# Patient Record
Sex: FEMALE | Race: WHITE
[De-identification: names, ages, dates, MRNs, and addresses within clinical notes are randomized per-mention and may not be internally consistent; named-entity substitution may affect disease eponyms.]

---

## 2018-05-01 ENCOUNTER — HOSPITAL ENCOUNTER (OUTPATIENT)
Dept: HOSPITAL 87 - OR | Age: 68
Discharge: HOME | End: 2018-05-01
Attending: OPHTHALMOLOGY
Payer: COMMERCIAL

## 2018-05-01 VITALS — HEIGHT: 67 IN | WEIGHT: 230 LBS | BODY MASS INDEX: 36.1 KG/M2

## 2018-05-01 DIAGNOSIS — I10: ICD-10-CM

## 2018-05-01 DIAGNOSIS — E03.8: ICD-10-CM

## 2018-05-01 DIAGNOSIS — M16.12: ICD-10-CM

## 2018-05-01 DIAGNOSIS — Z79.899: ICD-10-CM

## 2018-05-01 DIAGNOSIS — Z88.0: ICD-10-CM

## 2018-05-01 DIAGNOSIS — Z88.2: ICD-10-CM

## 2018-05-01 DIAGNOSIS — Z91.09: ICD-10-CM

## 2018-05-01 DIAGNOSIS — H25.89: Primary | ICD-10-CM

## 2018-05-01 PROCEDURE — 66984 XCAPSL CTRC RMVL W/O ECP: CPT

## 2018-08-07 ENCOUNTER — HOSPITAL ENCOUNTER (OUTPATIENT)
Dept: HOSPITAL 87 - OR | Age: 68
Discharge: HOME | End: 2018-08-07
Attending: OPHTHALMOLOGY
Payer: COMMERCIAL

## 2018-08-07 VITALS — WEIGHT: 230 LBS | BODY MASS INDEX: 36.1 KG/M2 | HEIGHT: 67 IN

## 2018-08-07 DIAGNOSIS — I10: ICD-10-CM

## 2018-08-07 DIAGNOSIS — Z79.899: ICD-10-CM

## 2018-08-07 DIAGNOSIS — Z88.0: ICD-10-CM

## 2018-08-07 DIAGNOSIS — H25.012: Primary | ICD-10-CM

## 2018-08-07 DIAGNOSIS — Z79.82: ICD-10-CM

## 2018-08-07 DIAGNOSIS — E03.9: ICD-10-CM

## 2018-08-07 DIAGNOSIS — M16.12: ICD-10-CM

## 2018-08-07 DIAGNOSIS — Z88.2: ICD-10-CM

## 2018-08-07 DIAGNOSIS — Z88.8: ICD-10-CM

## 2018-08-07 PROCEDURE — 66984 XCAPSL CTRC RMVL W/O ECP: CPT

## 2019-08-16 NOTE — PRE-OP HX & PHY REPO 2 SIG
DATE OF ADMISSION:  08/19/2019

DATE OF SURGERY:  08/19/2019.



PREOPERATIVE DIAGNOSIS:  Total retinal detachment, left eye.



BRIEF NOTE:  This is a first [facility] admission for Ms. Mejia.

This is a very nice 68-year-old lady, who complained of blurred vision in

the left eye after cataract surgery.  On examination, she was found to

have a total retinal detachment on this side.



PAST OCULAR HISTORY:  Remarkable for a similar cataract surgery done on the

right eye in May of 2018 with out problems.



MEDICAL HISTORY:  Remarkable for hypertension.



MEDICATIONS:  She is on metoprolol and hydrochlorothiazide for this

condition as well as pravastatin for elevated cholesterol and

levothyroxine.



ALLERGIES:  She is allergic to sulfa and penicillins.



OCULAR EXAMINATION:  Best vision at the time of admission was 20/25 in the

right eye and counting fingers at 2 feet in the left with pressures of 18

and 3.  The anterior segment on the right showed a posterior chamber lens

in good position.  The left eye showed a similar position in posterior

chamber lens.  Funduscopic exam of the right eye showed a choroidal nevus

inferior and slightly nasal to the nerve, but appeared flat and benign.

The periphery was without retinal tears.  The left fundus showed a macula

off total retinal detachment.  There was suspicion of the retinal break at

the 10 o'clock position mixed with a fold.



General physical examination was done by the patient's internist, Dr. Bose.



ASSESSMENT:  Macula off retinal detachment, total, left eye.



PLAN:  The plan is to perform a pars plana vitrectomy with scleral

buckling, endo drainage, endolaser, and probable silicone oil injection in

the left eye.  The risks and benefits of surgery were gone over with the

patient with potential for infection, the need for additional operations,

recurrent detachment, and remote possibility of loss of the eye.  The risk

of anesthesia was discussed.  The patient understands and consents to the

surgery, which will be performed on Monday morning.









  ______________________________________________

  Curtis Colby M.D.





DR:  MARITO

D:  08/16/2019 13:55

T:  08/16/2019 17:09

JOB#:  [id number]

CC:

## 2019-08-19 ENCOUNTER — HOSPITAL ENCOUNTER (OUTPATIENT)
Dept: HOSPITAL 72 - SUR | Age: 69
Discharge: HOME | End: 2019-08-19
Payer: COMMERCIAL

## 2019-08-19 VITALS — BODY MASS INDEX: 37.51 KG/M2 | WEIGHT: 239 LBS | HEIGHT: 67 IN

## 2019-08-19 VITALS — SYSTOLIC BLOOD PRESSURE: 140 MMHG | DIASTOLIC BLOOD PRESSURE: 65 MMHG

## 2019-08-19 VITALS — DIASTOLIC BLOOD PRESSURE: 95 MMHG | SYSTOLIC BLOOD PRESSURE: 146 MMHG

## 2019-08-19 VITALS — DIASTOLIC BLOOD PRESSURE: 85 MMHG | SYSTOLIC BLOOD PRESSURE: 138 MMHG

## 2019-08-19 VITALS — SYSTOLIC BLOOD PRESSURE: 145 MMHG | DIASTOLIC BLOOD PRESSURE: 66 MMHG

## 2019-08-19 VITALS — SYSTOLIC BLOOD PRESSURE: 147 MMHG | DIASTOLIC BLOOD PRESSURE: 72 MMHG

## 2019-08-19 VITALS — DIASTOLIC BLOOD PRESSURE: 55 MMHG | SYSTOLIC BLOOD PRESSURE: 144 MMHG

## 2019-08-19 VITALS — DIASTOLIC BLOOD PRESSURE: 88 MMHG | SYSTOLIC BLOOD PRESSURE: 135 MMHG

## 2019-08-19 VITALS — DIASTOLIC BLOOD PRESSURE: 70 MMHG | SYSTOLIC BLOOD PRESSURE: 148 MMHG

## 2019-08-19 VITALS — DIASTOLIC BLOOD PRESSURE: 77 MMHG | SYSTOLIC BLOOD PRESSURE: 144 MMHG

## 2019-08-19 DIAGNOSIS — Z88.2: ICD-10-CM

## 2019-08-19 DIAGNOSIS — E03.9: ICD-10-CM

## 2019-08-19 DIAGNOSIS — H33.052: Primary | ICD-10-CM

## 2019-08-19 DIAGNOSIS — K21.9: ICD-10-CM

## 2019-08-19 DIAGNOSIS — E66.01: ICD-10-CM

## 2019-08-19 DIAGNOSIS — I10: ICD-10-CM

## 2019-08-19 DIAGNOSIS — Z90.49: ICD-10-CM

## 2019-08-19 DIAGNOSIS — Z88.0: ICD-10-CM

## 2019-08-19 PROCEDURE — 94150 VITAL CAPACITY TEST: CPT

## 2019-08-19 PROCEDURE — 94003 VENT MGMT INPAT SUBQ DAY: CPT

## 2019-08-19 PROCEDURE — 67108 REPAIR DETACHED RETINA: CPT

## 2019-08-19 RX ADMIN — CYCLOPENTOLATE HYDROCHLORIDE SCH DROP: 10 SOLUTION OPHTHALMIC at 06:29

## 2019-08-19 RX ADMIN — MOXIFLOXACIN HYDROCHLORIDE SCH DROP: 5 SOLUTION/ DROPS OPHTHALMIC at 06:29

## 2019-08-19 RX ADMIN — FLURBIPROFEN SODIUM SCH DROP: 0.3 SOLUTION/ DROPS OPHTHALMIC at 06:29

## 2019-08-19 RX ADMIN — MOXIFLOXACIN HYDROCHLORIDE SCH DROP: 5 SOLUTION/ DROPS OPHTHALMIC at 06:18

## 2019-08-19 RX ADMIN — PHENYLEPHRINE HYDROCHLORIDE SCH DROP: 25 SOLUTION/ DROPS OPHTHALMIC at 06:18

## 2019-08-19 RX ADMIN — PHENYLEPHRINE HYDROCHLORIDE SCH DROP: 25 SOLUTION/ DROPS OPHTHALMIC at 06:29

## 2019-08-19 RX ADMIN — MOXIFLOXACIN HYDROCHLORIDE SCH DROP: 5 SOLUTION/ DROPS OPHTHALMIC at 06:23

## 2019-08-19 RX ADMIN — PHENYLEPHRINE HYDROCHLORIDE SCH DROP: 25 SOLUTION/ DROPS OPHTHALMIC at 06:24

## 2019-08-19 RX ADMIN — CYCLOPENTOLATE HYDROCHLORIDE SCH DROP: 10 SOLUTION OPHTHALMIC at 06:18

## 2019-08-19 RX ADMIN — CYCLOPENTOLATE HYDROCHLORIDE SCH DROP: 10 SOLUTION OPHTHALMIC at 06:24

## 2019-08-19 RX ADMIN — FLURBIPROFEN SODIUM SCH DROP: 0.3 SOLUTION/ DROPS OPHTHALMIC at 06:18

## 2019-08-19 RX ADMIN — FLURBIPROFEN SODIUM SCH DROP: 0.3 SOLUTION/ DROPS OPHTHALMIC at 06:23

## 2019-08-19 NOTE — BRIEF OPERATIVE NOTE
Immediate Post Operative Note


Operative Note


Chief Complaint:  Loss of vision Left Eye


Pre-op Diagnosis:


Total retinal detachment Left eye


Procedure:


PPV, Scleral buckle (240 band, 287 tire, 70 sleeve) membrane peel, endolaser (

1331 spots), Kenalog injection and removal, Endodrainage, Silicone oil 

injection Left eye


Post-op Diagnosis:  same as pre-op


Surgeon:  Earnestine


Anesthesiologist:  Cory


Specimen:  none


Complications:  none


Condition:  stable


Fluids:  Per anesthesia


Estimated Blood Loss:  none


Drains:  none


Implant(s) used?:  Yes











Curtis Colby MD Aug 19, 2019 10:24

## 2019-08-19 NOTE — ANETHESIA PREOPERATIVE EVAL
Anesthesia Pre-op PMH/ROS


General


Date of Evaluation:  Aug 19, 2019


Anesthesiologist:  [place]


ASA Score:  ASA 2


Mallampati Score


Class I : Soft palate, uvula, fauces, pillars visible


Class II: Soft palate, uvula, fauces visible


Class III: Soft palate, base of uvula visible


Class IV: Only hard plate visible


Mallampati Classification:  Class III


Surgeon:  Karon


Diagnosis:  Left retinal detachment


Surgical Procedure:  Left eye vitrectomy


Anesthesia History:  none


Family History:  no anesthesia problems


Allergies:  


Coded Allergies:  


     PENICILLINS (Verified  Allergy, Unknown, 8/16/19)


     SULFA (SULFONAMIDE ANTIBIOTICS) (Verified  Allergy, Unknown, 8/16/19)


Medications:  see eMAR


Patient NPO?:  Yes


NPO Date:  Aug 18, 2019


NPO Time:  22:00





Past Medical History


Cardiovascular:  Reports: HTN, other - HLD; 


   Denies: CAD, MI, valve dz, arrhythmia


Pulmonary:  Denies: asthma, COPD, CHITRA, other


Gastrointestinal/Genitourinary:  Reports: GERD; 


   Denies: CRI, ESRD, other


Neurologic/Psychiatric:  Denies: dementia, CVA, depression/anxiety, TIA, other


Endocrine:  Reports: hypothyroidism; 


   Denies: DM, steroids, other


HEENT:  Reports: [place] (L), [place] (R); 


   Denies: cataract (L), cataract (R), glaucoma, other


Hematology/Immune:  Denies: anemia, DVT, bleeding disorder, other


Musculoskeletal/Integumentary:  Denies: OA, RA, DJD, DDD, edema, other


Other:  obesity - morbid


PSxH Narrative:


lap cristin, b cataracts, T&A





Anesthesia Pre-op Phys. Exam


Physician Exam





Last Vital Signs








  Date Time  Temp Pulse Resp B/P (MAP) Pulse Ox O2 Delivery O2 Flow Rate FiO2


 


8/19/19 06:27      Room Air  


 


8/19/19 06:15 97.1 68 18 144/55 98   








Constitutional:  NAD


Cardiovascular:  RRR


Respiratory:  CTA





Airway Exam


Mallampati Score:  Class III


MO:  limited


Neck:  short, obese


ROM:  limited





Anesthesia Pre-op A/P


Labs


see chart





Studies


Pre-op Studies:  EKG - sr





Risk Assessment & Plan


Assessment:


ASA II


Plan:


GA- as per surgical request


Status Change Before Surgery:  No





Pre-Antibiotics


Drug:  N/A











Irina Holland MD Aug 19, 2019 07:16

## 2019-08-19 NOTE — PRE-PROCEDURE NOTE/ATTESTATION
Pre-Procedure Note/Attestation


Complete Prior to Procedure


Planned Procedure:  left


Procedure Narrative:


PPV, membrane peel, scleral buckle, endodrainage, possible silicone oil 

injection, possible gas injection Left eye.





Indications for Procedure


Pre-Operative Diagnosis:


Total retinal detachment Left eye





Attestation


I attest that I discussed the nature of the procedure; its benefits; risks and 

complications; and alternatives (and the risks and benefits of such alternatives

), prior to the procedure, with the patient (or the patient's legal 

representative).





I attest that, if there was a reasonable possibility of needing a blood 

transfusion, the patient (or the patient's legal representative) was given the 

[place] [facility] standardized written summary, pursuant 

to the Derrick Yan Blood Safety Act ([place] Health and Safety Code # 1645, as 

amended).





I attest that I re-evaluated the patient just prior to the surgery and that 

there has been no change in the patient's H&P, except as documented below:











Curtis Colby MD Aug 19, 2019 07:53

## 2019-08-19 NOTE — 48 HOUR POST ANESTHESIA EVAL
Post Anesthesia Evaluation


Procedure:  Left eye posterior vitrectomy


Date of Evaluation:  Aug 19, 2019


Airway:  patent


Nausea:  No


Vomiting:  No


Hydration Status:  adequate


Cardiopulmonary Status:


at baseline


Mental Status/LOC:  patient returned to baseline


Post-Anesthesia Complications:


0


Follow-up care needed:  ready to discharge











Irina Holland MD Aug 19, 2019 10:14

## 2019-08-19 NOTE — IMMEDIATE POST-OP EVALUATION
Immediate Post-Op Evalulation


Immediate Post-Op Evalulation


Procedure:  Left eye posterior vitrectomy


Date of Evaluation:  Aug 19, 2019


Time of Evaluation:  10:14


IV Fluids:  300


Blood Products:  0


Estimated Blood Loss:  0


Urinary Output:  0


Blood Pressure Systolic:  146


Blood Pressure Diastolic:  95


Pulse Rate:  71


Respiratory Rate:  16


O2 Sat by Pulse Oximetry:  99


Temperature (Fahrenheit):  98.4


Pain Score (1-10):  0


Nausea:  No


Vomiting:  No


Complications


0


Patient Status:  awake, reacts, patent, none


Hydration Status:  adequate


Drug:  N/A











Irina Holland MD Aug 19, 2019 10:12

## 2019-08-19 NOTE — OPERATIVE NOTE - DICTATED
DATE OF OPERATION:  08/19/2019

PREOPERATIVE DIAGNOSIS:  Total retinal detachment, left eye.



POSTOPERATIVE DIAGNOSIS:  Total retinal detachment, left eye.



PROCEDURES:

1. Pars plana vitrectomy.

2. Scleral buckle.

3. Endo drainage.

4. Endolaser.

5. Kenalog injection.

6. Silicone oil injection, left eye.



SURGEON:  Curtis Colby M.D.



ASSISTANT:  None.



ANESTHESIA:  LMA general.



ANESTHESIOLOGIST:  Dr. Ray.



JUSTIFICATION FOR SURGERY:  This 68-year-old lady, status post cataract

surgery, was noted to have a total retinal detachment in the left eye.



BRIEF NOTE:  The patient was brought to the operative room and placed on OR

table in supine position.  After a time-out was performed and agreed upon

by the staff, general LMA anesthesia was induced by Dr. Ray.  The

patient was then prepped and draped in the normal manner.  A lid speculum

was inserted into the left eye.  A 360-degree peritomy was cut with

relaxation incisions at 3 and 9 o'clock positions.  Additional anesthetic

was then injected in the subtenon's space.  The muscles were isolated in

turn, on white and black ties.  The quadrants were explored and found to

be without scleral thinning.  Initial evaluation showed a total retinal

detachment with suspicious areas near 12 o'clock and along the inferior

retina at 4 and 6 o'clock.  Because of this, it was elected to place a

scleral buckle.  A segment of 287 tire was chosen as well as a 240 band.

The sutures were placed centered 14 mm from the limbus.  And the inferior

quadrants sutures were placed 6 mm apart and above 3 mm apart.  A 240 band

was then placed around the eye and secured with a 70 sleeve in the

superonasal quadrant.  A segment of 287 tire was then placed below the

inferior rectus and the two inferior mattress sutures.  The knots were

tied and rotated posteriorly.  The buckle was left moderately shallow

within somewhat long for later adjustment.



Preparation was then made for vitrectomy.  Using a 23-gauge trocar

system, cannulas were placed in all except infranasal quadrant.  Infusion

secured inferotemporally.  Vitrectomy was begun posterior to the lens

capsular complex taking care to avoid contact.  A central core vitrectomy

was done followed by peripheral vitrectomy leaving a small vitreous skirt.

The retina was noted to be totally detached and care was taken not to

engage retina.  Examination superiorly showed a large horseshoe tear

associated with old pigment.  No additional definite breaks were seen,

although pigmentation was noted at 6 and 4 o'clock.



Because of the bullous nature of the detachment, it was elected to do

endo drainage.  The posterior hyaloid had been removed with the aid of

silicone and so an area just nasal and slightly superior to the optic

nerve was chosen.  Endo cautery was placed followed by smooth drainage of

subretinal fluid through the retinotomy.  The retina was noted to flatten

nicely without significant folds or puckering.



With the retina under air, the endolaser was brought into the eye and a

power of 0.4 snyder duration 0.1 seconds, a total of 1331 lesions were

applied to surround the very large horseshoe tear and pigment superiorly

and also the two areas inferiorly.  With the retina flattened, endolaser

was also used to surround the posterior retinotomy.



At this juncture, silicone oil injection was performed using 1000

centistokes of silicone oil.  Roughly a 95% fill was obtained with the eye

being left normal to soft.  The sclerotomies were then closed with 8-0

Vicryl suture and conjunctiva and tenons capsule were pulled up and

secured with 6-0 plain catgut.  Subconjunctival Decadron and gentamicin

were then injected and topical prednisolone drops, Vigamox drops, and

atropine drops were instilled.  The eye was patched and shielded and the

patient was taken to Recovery in excellent condition.  There were no

complications.









  ______________________________________________

  Curtis Colby M.D.





DR:  RONAL

D:  08/19/2019 10:32

T:  08/19/2019 20:32

JOB#:  [id number]

CC:

## 2019-10-31 NOTE — PRE-OP HX & PHY REPO 2 SIG
DATE OF ADMISSION:  11/04/2019

PREOPERATIVE DIAGNOSIS:  Status post retinal detachment repair with

silicone oil, left eye.



BRIEF NOTE:  This is the second [facility] admission for the patient

who is very nice 69-year-old lady, who presented with a retinal detachment

in the left eye, which was repaired at [place] on 08/19/2019.  Silicone

oil was used in the repair as well as a scleral buckle.  She has done well

since procedure with complete flattening of the retina and is now admitted

for removal of the silicone oil.



PAST MEDICAL HISTORY:  Remarkable for hypertension.



MEDICATIONS:  She is on levothyroxine, metoprolol, hydrochlorothiazide, and

pravastatin.



ALLERGIES:  She has allergies to sulfa and penicillins.



SOCIAL HISTORY:  She does not smoke.



PHYSICAL EXAMINATION:  Best vision at the time of admission was 20/30 in

the right eye and 20/400 in the left with pressures of 18 and 12.  The

anterior segment on the right showed a posterior chamber lens in excellent

position.  The left showed a similarly positioned posterior chamber lens.

Fundus on the right showed a choroidal nevus inferior and slightly nasal

to the disc.  The retina was all attached.  The left fundus showed roughly

an 85% silicone oil fill.  There was retinal ablation to a sizable break

at the 12 o'clock position.  The posterior retinotomy was noted nasal to

the optic nerve.  The retina was entirely attached.



General physical examination was completed by the patient's internist.



ASSESSMENT:  Status post retinal detachment repair, left eye with silicone

oil.



PLAN:  Plan is to perform a pars plana vitrectomy with removal of the

silicone oil and placement of additional laser ablation if needed.  The

risks and benefits of surgery have been gone over with the patient

including the potential for infection, hemorrhage, glaucoma, recurrent

detachment, and remote possibility of loss of the eye.  The risk of

anesthesia was discussed.  The patient understands and consents to the

surgery, which will be performed on Monday morning.









  ______________________________________________

  Curtis Colby M.D.





DR:  TONYA

D:  10/31/2019 13:02

T:  10/31/2019 15:39

JOB#:  [id number]

CC:

## 2019-11-04 ENCOUNTER — HOSPITAL ENCOUNTER (OUTPATIENT)
Dept: HOSPITAL 72 - SUR | Age: 69
Discharge: HOME | End: 2019-11-04
Payer: COMMERCIAL

## 2019-11-04 VITALS — DIASTOLIC BLOOD PRESSURE: 62 MMHG | SYSTOLIC BLOOD PRESSURE: 138 MMHG

## 2019-11-04 VITALS — DIASTOLIC BLOOD PRESSURE: 93 MMHG | SYSTOLIC BLOOD PRESSURE: 143 MMHG

## 2019-11-04 VITALS — SYSTOLIC BLOOD PRESSURE: 132 MMHG | DIASTOLIC BLOOD PRESSURE: 64 MMHG

## 2019-11-04 VITALS — HEIGHT: 67 IN | BODY MASS INDEX: 37.67 KG/M2 | WEIGHT: 240 LBS

## 2019-11-04 VITALS — SYSTOLIC BLOOD PRESSURE: 144 MMHG | DIASTOLIC BLOOD PRESSURE: 61 MMHG

## 2019-11-04 VITALS — SYSTOLIC BLOOD PRESSURE: 113 MMHG | DIASTOLIC BLOOD PRESSURE: 62 MMHG

## 2019-11-04 VITALS — SYSTOLIC BLOOD PRESSURE: 127 MMHG | DIASTOLIC BLOOD PRESSURE: 65 MMHG

## 2019-11-04 VITALS — DIASTOLIC BLOOD PRESSURE: 65 MMHG | SYSTOLIC BLOOD PRESSURE: 143 MMHG

## 2019-11-04 VITALS — DIASTOLIC BLOOD PRESSURE: 77 MMHG | SYSTOLIC BLOOD PRESSURE: 142 MMHG

## 2019-11-04 DIAGNOSIS — K21.9: ICD-10-CM

## 2019-11-04 DIAGNOSIS — Z79.899: ICD-10-CM

## 2019-11-04 DIAGNOSIS — G47.33: ICD-10-CM

## 2019-11-04 DIAGNOSIS — Z88.2: ICD-10-CM

## 2019-11-04 DIAGNOSIS — H33.22: Primary | ICD-10-CM

## 2019-11-04 DIAGNOSIS — Z88.0: ICD-10-CM

## 2019-11-04 DIAGNOSIS — E03.9: ICD-10-CM

## 2019-11-04 DIAGNOSIS — M19.90: ICD-10-CM

## 2019-11-04 DIAGNOSIS — I10: ICD-10-CM

## 2019-11-04 DIAGNOSIS — E66.9: ICD-10-CM

## 2019-11-04 PROCEDURE — 94150 VITAL CAPACITY TEST: CPT

## 2019-11-04 PROCEDURE — 67039 LASER TREATMENT OF RETINA: CPT

## 2019-11-04 PROCEDURE — 94003 VENT MGMT INPAT SUBQ DAY: CPT

## 2019-11-04 RX ADMIN — PHENYLEPHRINE HYDROCHLORIDE SCH DROP: 25 SOLUTION/ DROPS OPHTHALMIC at 05:58

## 2019-11-04 RX ADMIN — MOXIFLOXACIN HYDROCHLORIDE SCH DROP: 5 SOLUTION/ DROPS OPHTHALMIC at 05:57

## 2019-11-04 RX ADMIN — CYCLOPENTOLATE HYDROCHLORIDE SCH DROP: 10 SOLUTION OPHTHALMIC at 06:23

## 2019-11-04 RX ADMIN — PHENYLEPHRINE HYDROCHLORIDE SCH DROP: 25 SOLUTION/ DROPS OPHTHALMIC at 06:23

## 2019-11-04 RX ADMIN — MOXIFLOXACIN HYDROCHLORIDE SCH DROP: 5 SOLUTION/ DROPS OPHTHALMIC at 06:09

## 2019-11-04 RX ADMIN — CYCLOPENTOLATE HYDROCHLORIDE SCH DROP: 10 SOLUTION OPHTHALMIC at 06:08

## 2019-11-04 RX ADMIN — PHENYLEPHRINE HYDROCHLORIDE SCH DROP: 25 SOLUTION/ DROPS OPHTHALMIC at 06:08

## 2019-11-04 RX ADMIN — MOXIFLOXACIN HYDROCHLORIDE SCH DROP: 5 SOLUTION/ DROPS OPHTHALMIC at 06:24

## 2019-11-04 RX ADMIN — FLURBIPROFEN SODIUM SCH DROP: 0.3 SOLUTION/ DROPS OPHTHALMIC at 06:09

## 2019-11-04 RX ADMIN — FLURBIPROFEN SODIUM SCH DROP: 0.3 SOLUTION/ DROPS OPHTHALMIC at 06:23

## 2019-11-04 RX ADMIN — CYCLOPENTOLATE HYDROCHLORIDE SCH DROP: 10 SOLUTION OPHTHALMIC at 05:57

## 2019-11-04 RX ADMIN — FLURBIPROFEN SODIUM SCH DROP: 0.3 SOLUTION/ DROPS OPHTHALMIC at 05:58

## 2019-11-04 NOTE — IMMEDIATE POST-OP EVALUATION
Immediate Post-Op Evalulation


Immediate Post-Op Evalulation


Procedure:  L eye PPV, silicon oil removal laser treatment


Date of Evaluation:  Nov 4, 2019


Time of Evaluation:  08:53


IV Fluids:  300


Blood Products:  none


Estimated Blood Loss:  min


Urinary Output:  none


Blood Pressure Systolic:  143


Blood Pressure Diastolic:  82


Pulse Rate:  62


Respiratory Rate:  18


O2 Sat by Pulse Oximetry:  99


Temperature (Fahrenheit):  97.5


Pain Score (1-10):  1


Nausea:  No


Vomiting:  No


Complications


none


Patient Status:  awake, patent, none


Hydration Status:  adequate











Jossue Thomas MD Nov 4, 2019 08:54

## 2019-11-04 NOTE — OPERATIVE NOTE - DICTATED
DATE OF OPERATION:  11/04/2019

PREOPERATIVE DIAGNOSIS:  Status post retinal detachment repair with

silicone oil, left eye.



POSTOPERATIVE DIAGNOSIS:  Status post retinal detachment repair with

silicone oil, left eye.



PROCEDURES:

1. Pars plana vitrectomy.

2. Endolaser.

3. Removal of silicone oil.

4. Air-fluid exchange, left eye.



SURGEON:  Curtis Colby M.D.



ASSISTANT:  None.



ANESTHESIA:  Local with sedation.



ANESTHESIOLOGIST:  Jossue Thomas M.D.



JUSTIFICATION FOR SURGERY:  This 69-year-old lady developed a retinal

detachment several months ago, which required a scleral buckle,

vitrectomy, and silicone oil injection.  She was readmitted for removal of

silicon oil.



BRIEF NOTE:  The patient was brought to the operating room, placed on

operating room table in supine position.  After a time-out was performed

and agreed upon by the staff, and initial monitoring secured by Dr. Thomas, retrobulbar and Van Lint blocks were given in the standard way.

When the blocks taken effect, she was prepped and draped in normal

manner.  A lid speculum was inserted into the left eye.  Using a 23-gauge

trocar system, cannulas were placed in all except infranasal quadrant.

Infusion secured inferotemporally.  Vitrectomy was begun posterior to the

lens by removing the silicone oil bubble with the viscous fluid extractor.

This went without difficulty.  Additional small bubbles were removed with

the vitrector.  It was elected to reinforce the retinopexy posterior to

the prior line of laser.  The endolaser was then brought into the eye and

a power of 0.35 snyder and duration of 0.1 seconds, a total of 836 lesions

were applied inferiorly from _____ placing three rows posterior to the

previous area of buckling.  No problems were encountered.  Superiorly, the

wound was noted to be nicely sealed.



An air-fluid exchange was then performed to roughly 95% removing

residual fluid at a company bubbles.



The cannulas were then removed from the eye and each sclerotomy secured

with a suture of 8-0 Vicryl with a single surgeon's knot and long _____

facilitating removal in immediate postop period.



Subconjunctival Decadron and gentamicin were then injected and topical

Maxitrol ointment, prednisolone drops, moxifloxacin drops, and atropine

drops were instilled.  The eye was patched and shielded and the patient

taken to recovery in excellent condition.  There were no complications.









  ______________________________________________

  Curtis Colby M.D.





DR:  TEREZA

D:  11/04/2019 08:58

T:  11/04/2019 15:12

JOB#:  [id number]

CC:  Curtis Colby M.D.; Fax#:  [phone]

## 2019-11-04 NOTE — BRIEF OPERATIVE NOTE
Immediate Post Operative Note


Operative Note


Chief Complaint:  Blurred vision Left eye


Pre-op Diagnosis:


S/P retinal detachment with silicone oil LEFT eye


Procedure:


PPV, silicone oil removal, Endolaser 836 spots, air fluid exchange LEFT eye


Post-op Diagnosis:  same as pre-op


Findings:  consistent w/pre-op dx studies


Surgeon:  Earnestine


Anesthesiologist:  William


Anesthesia:  MAC


Specimen:  none


Complications:  none


Condition:  stable


Fluids:  Per Anesthesia


Estimated Blood Loss:  none


Drains:  none


Implant(s) used?:  No - .











Curtis Colby MD Nov 4, 2019 08:51

## 2019-11-04 NOTE — 48 HOUR POST ANESTHESIA EVAL
Post Anesthesia Evaluation


Procedure:  L eye PPV, silicon oil removal laser treatment


Date of Evaluation:  Nov 4, 2019


Time of Evaluation:  10:07


Blood Pressure Systolic:  132


0:  64


Pulse Rate:  71


Respiratory Rate:  20


Temperature (Fahrenheit):  97.5


O2 Sat by Pulse Oximetry:  98


Airway:  patent


Nausea:  No


Vomiting:  No


Pain Intensity:  1


Hydration Status:  adequate


Cardiopulmonary Status:


stable


Mental Status/LOC:  patient returned to baseline


Follow-up Care/Observations:


n/a


Post-Anesthesia Complications:


none


Follow-up care needed:  ready to discharge











Jossue Thomas MD Nov 4, 2019 10:09

## 2019-11-04 NOTE — PRE-PROCEDURE NOTE/ATTESTATION
Pre-Procedure Note/Attestation


Complete Prior to Procedure


Planned Procedure:  left


Procedure Narrative:


PPV, removal of silicone oil, Endolaser LEFT eye





Indications for Procedure


Pre-Operative Diagnosis:


S/P retinal detachment with silicone oil LEFT eye





Attestation


I attest that I discussed the nature of the procedure; its benefits; risks and 

complications; and alternatives (and the risks and benefits of such alternatives

), prior to the procedure, with the patient (or the patient's legal 

representative).





I attest that, if there was a reasonable possibility of needing a blood 

transfusion, the patient (or the patient's legal representative) was given the 

[facility] standardized written summary, pursuant 

to the Derrick Yan Blood Safety Act ([place] Health and Safety Code # 1645, as 

amended).





I attest that I re-evaluated the patient just prior to the surgery and that 

there has been no change in the patient's H&P, except as documented below:











Curtis Colby MD Nov 4, 2019 07:48

## 2019-11-04 NOTE — ANETHESIA PREOPERATIVE EVAL
Anesthesia Pre-op PMH/ROS


General


Date of Evaluation:  Nov 4, 2019


Time of Evaluation:  07:35


Anesthesiologist:  William


ASA Score:  ASA 3


Mallampati Score


Class I : Soft palate, uvula, fauces, pillars visible


Class II: Soft palate, uvula, fauces visible


Class III: Soft palate, base of uvula visible


Class IV: Only hard plate visible


Mallampati Classification:  Class III


Surgeon:  Earnestine


Diagnosis:  L eye retinal detouchment


Surgical Procedure:  L eye PPV


Anesthesia History:  none


Social History:  smoking - h/o


Allergies:  


Coded Allergies:  


     PENICILLINS (Verified  Allergy, Unknown, 8/16/19)


     SULFA (SULFONAMIDE ANTIBIOTICS) (Verified  Allergy, Unknown, 8/16/19)


Medications:  see eMAR


Patient NPO?:  Yes





Past Medical History


Cardiovascular:  Reports: HTN; 


   Denies: CAD, MI, valve dz, arrhythmia, other


Pulmonary:  Reports: CHITRA; 


   Denies: asthma, COPD, other


Gastrointestinal/Genitourinary:  Reports: GERD; 


   Denies: CRI, ESRD, other


Neurologic/Psychiatric:  Reports: other - chronic pain; 


   Denies: dementia, CVA, depression/anxiety, TIA


Endocrine:  Reports: hypothyroidism; 


   Denies: DM, steroids, other


HEENT:  Reports: cataract (L), cataract (R) - bilateral


Hematology/Immune:  Reports: anemia - mild; 


   Denies: DVT, bleeding disorder, other


Musculoskeletal/Integumentary:  Reports: OA; 


   Denies: RA, DJD, DDD, edema, other


Other:  obesity


PMH Narrative:


as above


PSxH Narrative:


bilateral cataracts





Anesthesia Pre-op Phys. Exam


Physician Exam





Last Vital Signs








  Date Time  Temp Pulse Resp B/P (MAP) Pulse Ox O2 Delivery O2 Flow Rate FiO2


 


11/4/19 06:14      Room Air  


 


11/4/19 06:06 98.2 57 18 113/62 97   








Constitutional:  NAD


Neurologic:  CN 2-12 intact


Cardiovascular:  RRR, no M/R/G


Respiratory:  CTA


Gastrointestinal:  other - obesity





Airway Exam


Mallampati Score:  Class II


MO:  full


Neck:  stiff


ROM:  limited


Teeth:  missing


Dentures:  no upper, no lower





Anesthesia Pre-op A/P


Labs


see chart





Studies


Pre-op Studies:  EKG - NSR





Risk Assessment & Plan


Assessment:


ASA 3


Plan:


MAC with retrobulbar block


Status Change Before Surgery:  No





Pre-Antibiotics


Drug:  none











Jossue Thomas MD Nov 4, 2019 07:43